# Patient Record
Sex: MALE | Race: BLACK OR AFRICAN AMERICAN
[De-identification: names, ages, dates, MRNs, and addresses within clinical notes are randomized per-mention and may not be internally consistent; named-entity substitution may affect disease eponyms.]

---

## 2019-12-24 ENCOUNTER — HOSPITAL ENCOUNTER (OUTPATIENT)
Dept: HOSPITAL 62 - RAD | Age: 69
End: 2019-12-24
Attending: PHYSICIAN ASSISTANT
Payer: MEDICARE

## 2019-12-24 DIAGNOSIS — N18.1: Primary | ICD-10-CM

## 2019-12-24 DIAGNOSIS — N40.0: ICD-10-CM

## 2019-12-24 PROCEDURE — 76770 US EXAM ABDO BACK WALL COMP: CPT

## 2019-12-24 NOTE — RADIOLOGY REPORT (SQ)
EXAM DESCRIPTION:  U/S RETROPERITON (RENAL/AORTA)



COMPLETED DATE/TIME:  12/24/2019 3:43 pm



REASON FOR STUDY:  (N18.1)CHRONIC KIDNEY DISEASE, STAGE 1 N18.1  CHRONIC KIDNEY DISEASE, STAGE 1



COMPARISON:  Prior renal ultrasound dated 7/21/2017, prior CT abdomen and pelvis dated 8/15/2014



TECHNIQUE:  Dynamic and static grayscale images acquired of the kidneys and bladder and recorded on P
ACS. Additional selected color Doppler and spectral images recorded.



LIMITATIONS:  None.



FINDINGS:  RIGHT KIDNEY: Normal size. Normal echogenicity. No solid or suspicious masses. No hydronep
hrosis. No calcifications.

LEFT KIDNEY:  Normal size. Normal echogenicity. No solid or suspicious masses. No hydronephrosis. No 
calcifications.

BLADDER: The bladder continues to indent the base of bladder.  Similar findings were noted on prior s
tudy.  The prostate measures 4.8 x 5.4 x 4.0 cm.

OTHER FINDINGS: No other significant finding.



IMPRESSION:  Enlarged prostate which indents the base of the bladder.  No other significant findings.




TECHNICAL DOCUMENTATION:  JOB ID:  [id number]

 2011 [employer]- All Rights Reserved



Reading location - IP/workstation name: [email or web address]